# Patient Record
Sex: FEMALE | Race: BLACK OR AFRICAN AMERICAN | NOT HISPANIC OR LATINO | Employment: UNEMPLOYED | URBAN - METROPOLITAN AREA
[De-identification: names, ages, dates, MRNs, and addresses within clinical notes are randomized per-mention and may not be internally consistent; named-entity substitution may affect disease eponyms.]

---

## 2019-11-06 ENCOUNTER — SOCIAL WORK (OUTPATIENT)
Dept: ADMINISTRATIVE | Facility: OTHER | Age: 19
End: 2019-11-06

## 2019-11-06 NOTE — PROGRESS NOTES
Late EntrySW met with pt regarding initial OB assessment. Pt stated this is her 2nd pregnancy/0-miscarriage. Pt stated lives with her aunt/fiancee/child-1 1/2 and able to perform ADL's independently.Pt stated has medicaid([facility]). Pt stated does not have WIC. SW provide pt with information on other community resources.SW faxed and scanned pt's notification of pregnancy into epic.  No other needs identified at this time.    Barb Pollock,MSW  Pager#[phone]

## 2019-11-14 PROBLEM — E03.9 HYPOTHYROID: Status: ACTIVE | Noted: 2019-11-14

## 2020-03-10 PROBLEM — Z91.199 MEDICAL NON-COMPLIANCE: Status: ACTIVE | Noted: 2020-03-10

## 2020-03-10 PROBLEM — Z98.891 HISTORY OF CESAREAN SECTION: Status: ACTIVE | Noted: 2020-03-10

## 2020-03-10 PROBLEM — Z87.59 HISTORY OF PRIOR PREGNANCY WITH IUGR NEWBORN: Status: ACTIVE | Noted: 2020-03-10

## 2020-03-10 PROBLEM — O09.33 LATE PRENATAL CARE AFFECTING PREGNANCY IN THIRD TRIMESTER: Status: ACTIVE | Noted: 2020-03-10

## 2020-03-10 PROBLEM — O40.3XX0 POLYHYDRAMNIOS IN THIRD TRIMESTER: Status: ACTIVE | Noted: 2020-03-10

## 2020-03-10 PROBLEM — O99.280 HYPOTHYROIDISM AFFECTING PREGNANCY, ANTEPARTUM: Status: ACTIVE | Noted: 2019-11-14

## 2020-03-10 PROBLEM — Z3A.34 34 WEEKS GESTATION OF PREGNANCY: Status: ACTIVE | Noted: 2020-03-10

## 2020-03-10 PROBLEM — O16.3 HYPERTENSION AFFECTING PREGNANCY IN THIRD TRIMESTER: Status: ACTIVE | Noted: 2020-03-10

## 2020-03-10 PROBLEM — O09.299 HISTORY OF PRE-ECLAMPSIA IN PRIOR PREGNANCY, CURRENTLY PREGNANT: Status: ACTIVE | Noted: 2020-03-10

## 2020-03-10 PROBLEM — E66.01 CLASS 3 SEVERE OBESITY DUE TO EXCESS CALORIES WITH SERIOUS COMORBIDITY IN ADULT: Status: ACTIVE | Noted: 2020-03-10

## 2020-03-13 ENCOUNTER — SOCIAL WORK (OUTPATIENT)
Dept: ADMINISTRATIVE | Facility: OTHER | Age: 20
End: 2020-03-13

## 2020-03-13 NOTE — PROGRESS NOTES
LYNDA met with pt regarding insurance cover for glucose supplies;per pt lost her medicaid card and unsure of coverage for medication. LYNDA made phone call to pt pharmacy([facility] [phone]) spoke with [place]- pharmacy representative regarding pt's coverage for her glucose supplies and provide her information on pt's insurance/medication. Kirstin-pharmacy representative stated pt has two profile in their system but did run the information and pt can  medication(glucose supplies/thyroid) in an hour with no co-pay. LYNDA informed pt regarding the above. No other needs identified at this time.    Barb Pollock,MSW  Pager#[phone]

## 2020-04-07 PROBLEM — Z3A.38 38 WEEKS GESTATION OF PREGNANCY: Status: ACTIVE | Noted: 2020-03-10

## 2020-04-13 PROBLEM — O16.3 HYPERTENSION AFFECTING PREGNANCY IN THIRD TRIMESTER: Status: RESOLVED | Noted: 2020-03-10 | Resolved: 2020-04-13

## 2020-04-13 PROBLEM — Z3A.39 39 WEEKS GESTATION OF PREGNANCY: Status: ACTIVE | Noted: 2020-03-10

## 2020-04-13 PROBLEM — O09.299 HISTORY OF PRE-ECLAMPSIA IN PRIOR PREGNANCY, CURRENTLY PREGNANT: Status: RESOLVED | Noted: 2020-03-10 | Resolved: 2020-04-13

## 2020-04-13 PROBLEM — I10 ESSENTIAL HYPERTENSION: Status: ACTIVE | Noted: 2020-04-13

## 2020-04-13 PROBLEM — E03.9 HYPOTHYROIDISM AFFECTING PREGNANCY, ANTEPARTUM: Status: RESOLVED | Noted: 2019-11-14 | Resolved: 2020-04-13

## 2020-04-13 PROBLEM — E03.9 HYPOTHYROIDISM: Status: ACTIVE | Noted: 2020-04-13

## 2020-04-13 PROBLEM — Z3A.39 39 WEEKS GESTATION OF PREGNANCY: Status: RESOLVED | Noted: 2020-03-10 | Resolved: 2020-04-13

## 2020-04-13 PROBLEM — O99.280 HYPOTHYROIDISM AFFECTING PREGNANCY, ANTEPARTUM: Status: RESOLVED | Noted: 2019-11-14 | Resolved: 2020-04-13

## 2020-04-13 PROBLEM — Z30.430 ENCOUNTER FOR INSERTION OF MIRENA IUD: Status: ACTIVE | Noted: 2020-04-13

## 2020-04-13 PROBLEM — Z87.59 HISTORY OF PRIOR PREGNANCY WITH IUGR NEWBORN: Status: RESOLVED | Noted: 2020-03-10 | Resolved: 2020-04-13

## 2020-04-13 PROBLEM — O40.3XX0 POLYHYDRAMNIOS IN THIRD TRIMESTER: Status: RESOLVED | Noted: 2020-03-10 | Resolved: 2020-04-13

## 2020-04-13 PROBLEM — O09.33 LATE PRENATAL CARE AFFECTING PREGNANCY IN THIRD TRIMESTER: Status: RESOLVED | Noted: 2020-03-10 | Resolved: 2020-04-13

## 2020-04-13 PROBLEM — O24.319 PREGESTATIONAL DIABETES MELLITUS, MODIFIED WHITE CLASS B: Status: ACTIVE | Noted: 2020-04-13

## 2020-04-14 PROBLEM — O24.319 PREGESTATIONAL DIABETES MELLITUS, MODIFIED WHITE CLASS B: Status: RESOLVED | Noted: 2020-04-13 | Resolved: 2020-04-14

## 2021-05-12 ENCOUNTER — PATIENT MESSAGE (OUTPATIENT)
Dept: RESEARCH | Facility: HOSPITAL | Age: 21
End: 2021-05-12